# Patient Record
Sex: MALE | URBAN - METROPOLITAN AREA
[De-identification: names, ages, dates, MRNs, and addresses within clinical notes are randomized per-mention and may not be internally consistent; named-entity substitution may affect disease eponyms.]

---

## 2022-10-07 ENCOUNTER — TELEPHONE (OUTPATIENT)
Dept: SLEEP MEDICINE | Facility: CLINIC | Age: 47
End: 2022-10-07

## 2022-10-07 DIAGNOSIS — G47.33 OSA (OBSTRUCTIVE SLEEP APNEA): Primary | ICD-10-CM

## 2022-10-07 NOTE — TELEPHONE ENCOUNTER
Received sleep referral from [facility] routed to Dr. Lozano and Dr. Quinones for review.

## 2022-10-26 NOTE — TELEPHONE ENCOUNTER
Patient sleep consultation had been scheduled. Notified [facility] via [phone].

## 2022-12-09 ENCOUNTER — OFFICE VISIT (OUTPATIENT)
Dept: SLEEP MEDICINE | Facility: CLINIC | Age: 47
End: 2022-12-09
Payer: COMMERCIAL

## 2022-12-09 VITALS
WEIGHT: 222.38 LBS | DIASTOLIC BLOOD PRESSURE: 81 MMHG | BODY MASS INDEX: 31.84 KG/M2 | HEIGHT: 70 IN | OXYGEN SATURATION: 96 % | SYSTOLIC BLOOD PRESSURE: 120 MMHG | HEART RATE: 70 BPM

## 2022-12-09 DIAGNOSIS — G47.33 OSA (OBSTRUCTIVE SLEEP APNEA): Primary | ICD-10-CM

## 2022-12-09 PROCEDURE — 99203 OFFICE O/P NEW LOW 30 MIN: CPT | Performed by: STUDENT IN AN ORGANIZED HEALTH CARE EDUCATION/TRAINING PROGRAM

## 2022-12-09 NOTE — PATIENT INSTRUCTIONS
MY INFORMATION ON SLEEP APNEA-  Royal Reis    DOCTOR : Gene King MD  SLEEP CENTER :      MY CONTACT NUMBER:    [facility]  [phone]  [facility]      For general sleep health questions:   http://sleepeducation.org    For tips about PAP and COVID-19:  https://www.thoracic.org/patients/patient-resources/resources/covid-19-and-home-pap-therapy.pdf    For general info about COVID-19 including vaccines:  https://[facility].org/covid19      Continue PAP therapy every night, for all hours that you are sleeping (including naps.)  As always, try to get at least 8 hours of sleep or more each day, keep a regular sleep schedule, and avoid sleep deprivation. Avoid alcohol.  Reasons that you might need a change to your pressure therapy would be weight gain or loss, waking having inadvertently removed your PAP overnight, having previously felt refreshed by sleep with CPAP use and now waking un-refreshed, and return of daytime sleepiness. Also, the development of new medical problems  (such as heart failure, stroke, medications such as narcotics) can sometimes affect breathing at night and change your PAP therapy needs.  Please bring PAP with you if you are hospitalized.  If anticipating surgery be sure to discuss with your surgeon that you have sleep apnea and use PAP therapy.    Maintain your equipment as recommended which includes routine cleaning and replacement of supplies.      Call DME for any questions regarding supplies or maintenance.    [facility], [facility] [phone]    Do not drive on engage in potentially dangerous activities if feeling sleepy.  Please follow up in sleep clinic again in 3 months.        Tips for your PAP use-    Mask fitting tips  Mask fitting exercise:    To improve your mask seal and your mobility at night, put mask on and secure in place.  Lie down in bed with full pressure and  roll to one side, adjust headgear while in that position to eliminate any leaks. Repeat process rolling to other side.     The mask seal does not have to be perfect:   CPAP machines are designed to make up for small leaks. However, you will not tolerate leaks blowing in your eyes so you will need to adjust.   Any leak should only be near or at the bottom of the mask.  We expect your mask to leak slightly at night.    Do not over-tighten the headgear straps, tighter IS NOT better, we expect minimal leak.    First try re-positioning the mask or headgear before tightening the headgear straps.  Mask leaks are expected due to changing sleeping positions. Try pulling the mask away from your skin allowing the cushion to re-inflate will minimize the leak.  If you struggle for a good fit, try turning the CPAP off and then readjust the mask by pulling it away from your face and then turning back on the CPAP.        Humidifier tips  Humidifiers can be adjusted to increase or decrease the amount of moisture according to your comfort level. You may need to adjust this frequently at first, but then might only change it with seasonal weather changes.     Try INCREASING the humidity if:  You experience a dry, irritated nasal passage or throat.  You have a runny, drippy nose or sneezing fits after using CPAP.  You experience nasal congestion during or after CPAP use.    Try DECREASING the humidity if:  You have excessive condensation or  rain out  in the tubing or mask.  Otherwise keep the tubing warm during the night by running it underneath the blankets or pillow.      Clinic visit after initial PAP set-up   Bring your equipment with you to your 5-8 week follow up clinic visit.  We will be extracting your data from the machine if not available from the cloud based [facility].        Travel  Always take your equipment with you when you travel.  If you fly with your equipment bring it on with you as a carry on.  Medical equipment does  not count as a carry on.    If you travel international the machines take 110-240v.  The only adapter needed is the adapter that will fit into the receptacle (outlet).    You may also want to bring an extension cord as many hotel rooms have limited outlets at the bedside.  Do not travel with water in your humidifier chamber.     Cleaning and Maintenance Guidelines    Equipment Frequency Cleaning Method   Mask First Day    Daily      Weekly Soak mask in hot soapy water for 30 minutes, rinse and air dry.  Wipe nasal cushion with a hot soapy (Ivory, baby shampoo) cloth and rinse.  Baby wipes may also be used.  Do not use anti-bacterial soaps,Henrietta  liquid soap, rubbing alcohol, bleach or ammonia.  Wash frame in hot soapy water (Ivory, baby shampoo) rinse and let air dry   Headgear Biweekly Wash in hot soapy water, rinse and air dry   Reusable Gray Filter Weekly Wash in hot soapy water, rinse, put in towel squeeze moisture out, let air dry   Disposable White Filter Check Weekly Replace when brown or gray in color; at least every 2 to 3 months   Humidifier Chamber Daily    Weekly Empty distilled water from humidifier and let air dry    Hand wash in hot soapy water, rinse and air dry   Tubing Weekly Wash in hot soapy water, rinse and let air dry   Mask, Tubing and Humidifier Chamber As needed Disinfect: Soak in 1 part distilled white vinegar to 3 parts hot water for 30 minutes, rinse well and air dry  Not the material headgear        MASK AND SUPPLY REORDERING and EQUIPMENT NEEDS through your DME and per your insurance  Reminder: Most insurance companies will allow for a new mask, headgear, tubing, and reusable gray filter every six months.  Disposable white ultra-fine filters are covered monthly.      HOME AND SAFETY INSTRUCTIONS  Do not use frayed or cracked electrical cords, multi plug adaptors, or switched receptacles  Do not immerse electrical equipment into water  Assure that electrical cords do not become a tripping  hazard

## 2022-12-09 NOTE — NURSING NOTE
"Chief Complaint   Patient presents with     Consult     DOT;        Initial /81   Pulse 70   Ht 1.771 m (5' 9.72\")   Wt 100.9 kg (222 lb 6 oz)   SpO2 96%   BMI 32.16 kg/m   Estimated body mass index is 32.16 kg/m  as calculated from the following:    Height as of this encounter: 1.771 m (5' 9.72\").    Weight as of this encounter: 100.9 kg (222 lb 6 oz).    Medication Reconciliation: complete    Neck circumference: 43 centimeters.    DME: n/a    JUANA Dasilva  [facility]      "

## 2022-12-10 NOTE — ASSESSMENT & PLAN NOTE
Sleep study showed Moderate obstructive sleep apnea. Sleep related hypoxemia was not present.  Additional findings from the sleep study showed abnormal findings including AHI 19.9, REM AHI 35.3, supine AHI 31.7.    Following discussion with patient a shared decision was made to begin therapy with auto-CPAP at 5-15 cmH2O.    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  He is currently not working and would like to resume his work ASAP

## 2022-12-10 NOTE — PROGRESS NOTES
[place] [facility]  Consultation Note    Name: Royal Reis MRN#: [id number]   Age: 47 year old YOB: 1975     Date of Consultation: 12/09/22  Consultation is requested by: No ref. provider found  Primary care provider: [facility]    History of Present Illness:   Royal Reis is a 47 year old male patient with no known medical history  He is sent by No ref. provider found for a sleep consultation regarding Consult (DOT; )  .    Royal Reis main reason for visit: I did home sleep study given to me by dentist I wanted to make sure the results were legit  Patient states problem(s) started: 3 months ago  Royal Reis's goals for this visit: Figure out what I need to do to get my DOT card with this possible sleep apnea diagnosis    He has had a previous sleep study about 3 months ago. Important findings: AHI 19.9, REM AHI 35.3, supine AHI 31.7.  .    CPAP: No    SCALES:  EPWORTH SLEEPINESS SCALE    [place] Sleepiness Scale ( HUE Robison  1990-1997[facility] [place]/English - Final version - 21 Nov 07 - [facility] Research [employer].) 12/9/2022   [place] Score (Sleep) 6       INSOMNIA SEVERITY INDEX (BRIGID)    Insomnia Severity Index (BRIGID) 12/9/2022   Difficulty falling asleep 1   Difficulty staying asleep 1   Problems waking up too early 1   How SATISFIED/DISSATISFIED are you with your CURRENT sleep pattern? 2   How NOTICEABLE to others do you think your sleep problem is in terms of impairing the quality of your life? 1   How WORRIED/DISTRESSED are you about your current sleep problem? 3   To what extent do you consider your sleep problem to INTERFERE with your daily functioning (e.g. daytime fatigue, mood, ability to function at work/daily chores, concentration, memory, mood, etc.) CURRENTLY? 2   BRIGID Total Score 11   Guidelines for Scoring/Interpretation:  Total score categories:  0-7 = No clinically significant insomnia   8-14 = Subthreshold insomnia   15-21 = Clinical  "insomnia (moderate severity)  22-28 = Clinical insomnia (severe)  Used via courtesy of www.myhealth.va.gov with permission from Mariusz Nieves PhD., [facility]      STOP BANG   STOP BANG Questionnaire (  2008, the American Society of Anesthesiologists, Inc. [employer] 12/9/2022   STOP BANG Score (Sleep) 5   Neck Cir (cm) Clinic: 43   B/P Clinic: 120/81   BMI Clinic: 32.16   MELODY High Risk            Total Score: 5    MELODY: Snores loudly    MELODY: Observed stopped breathing    MELODY: BMI over 35 kg/m2    MELODY: Neck Circum >16 in    MELODY: Male          GAD7  No flowsheet data found.      CAGE-AID  No flowsheet data found.    CAGE-AID reprinted with permission from the [place] Medical Journal, NATHANAEL Garsia and MIKHAIL Wellington, \"Conjoint screening questionnaires for alcohol and drug abuse\" [place] Medical Journal 94: 135-140, 1995.      PATIENT HEALTH QUESTIONNAIRE-9 (PHQ - 9)  No flowsheet data found.  Developed by Mayra Bishop, Sonja Wilson, Romulo Dasilva and colleagues, with an educational katherine from [employer] No permission required to reproduce, translate, display or distribute.      Allergies:    No Known Allergies    Medications:    Current Outpatient Medications   Medication Sig Dispense Refill     UNABLE TO FIND MEDICATION NAME: fluoxetine         Problem List:  Patient Active Problem List    Diagnosis Date Noted     MELODY (obstructive sleep apnea) 12/09/2022     Priority: Medium        Past Medical/Surgical History:  No past medical history on file.  No past surgical history on file.    Social History:  Social History     Socioeconomic History     Marital status: Single     Spouse name: Not on file     Number of children: Not on file     Years of education: Not on file     Highest education level: Not on file   Occupational History     Not on file   Tobacco Use     Smoking status: Never     Smokeless tobacco: Never   Substance and Sexual Activity     Alcohol use: Not on " "file     Drug use: Not on file     Sexual activity: Not on file   Other Topics Concern     Not on file   Social History Narrative     Not on file     Social Determinants of Health     Financial Resource Strain: Not on file   Food Insecurity: Not on file   Transportation Needs: Not on file   Physical Activity: Not on file   Stress: Not on file   Social Connections: Not on file   Intimate Partner Violence: Not on file   Housing Stability: Not on file       Family History:  No family history on file.    Review of Systems:   A complete review of systems reviewed by me is negative with the exeption of what has been mentioned in the history of present illness.      Physical Exam:   Vitals: /81   Pulse 70   Ht 1.771 m (5' 9.72\")   Wt 100.9 kg (222 lb 6 oz)   SpO2 96%   BMI 32.16 kg/m    BMI= Body mass index is 32.16 kg/m .  Neck Cir (cm): 43 cm   GENERAL: Healthy, alert and no distress  EYES: Eyes grossly normal to inspection.  No discharge or erythema, or obvious scleral/conjunctival abnormalities.  RESP: No audible wheeze, cough, or visible cyanosis.  No visible retractions or increased work of breathing.    SKIN: Visible skin clear. No significant rash, abnormal pigmentation or lesions.  NEURO: Cranial nerves grossly intact.  Mentation and speech appropriate for age.  PSYCH: Mentation appears normal, affect normal/bright, judgement and insight intact, normal speech and appearance well-groomed.         Data: All pertinent previous laboratory data reviewed   No results for input(s): NA, POTASSIUM, CHLORIDE, CO2, ANIONGAP, GLC, BUN, CR, MANUELA in the last 12425 hours.    No results for input(s): WBC, RBC, HGB, HCT, MCV, MCH, MCHC, RDW, PLT in the last 02106 hours.    No results for input(s): PROTTOTAL, ALBUMIN, BILITOTAL, ALKPHOS, AST, ALT, BILIDIRECT in the last 45815 hours.    No results found for: TSH    No results found for: UAMP, UBARB, BENZODIAZEUR, UCANN, UCOC, OPIT, UPCP    No results found for: IRONSAT, " [id number], [facility]    No results found for: PH, PHARTERIAL, PO2, [id number], SAT, PCO2, HCO3, BASEEXCESS, TOM, BEB    @LABRCNTIPR(phv:4,pco2v:4,po2v:4,hco3v:4,chidi:4,o2per:4)@    Echocardiology: No results found for this or any previous visit (from the past 4320 hour(s)).    Chest x-ray: No results found for this or any previous visit from the past 365 days.      Chest CT: No results found for this or any previous visit from the past 365 days.      PFT: Most Recent Breeze Pulmonary Function Testing  No results found    Assessment and Plan:     Problem List Items Addressed This Visit        Respiratory    MELODY (obstructive sleep apnea) - Primary     Sleep study showed Moderate obstructive sleep apnea. Sleep related hypoxemia was not present.  Additional findings from the sleep study showed abnormal findings including AHI 19.9, REM AHI 35.3, supine AHI 31.7.    Following discussion with patient a shared decision was made to begin therapy with auto-CPAP at 5-15 cmH2O.    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  He is currently not working and would like to resume his work ASAP              Patient was strongly advised to avoid driving, operating any heavy machinery or other hazardous situations while drowsy or sleepy.  Patient was counseled on the importance of driving while alert, to pull over if drowsy, or nap before getting into the vehicle if sleepy.      Plan is for Royal Reis to follow up as needed.     The above note was dictated using voice recognition software. Although reviewed after completion, some word and grammatical error may remain . Please contact the author for any clarifications.    Total time spent reviewing medical records, history and physical examination, review of previous testing and  interpretation as well as documentation on this date, 12/09/22: 37 minutes    Gene King MD on 12/9/2022   [facility]   [address]oor [address], [address]   [address] [place] [address]   Appointments: [phone]    CC: No ref. provider found

## 2022-12-13 ENCOUNTER — TELEPHONE (OUTPATIENT)
Dept: SCHEDULING | Facility: CLINIC | Age: 47
End: 2022-12-13

## 2022-12-13 NOTE — TELEPHONE ENCOUNTER
Reason for call:  Other   Patient called regarding (reason for call): call back  Additional comments: [facility] on 12/09/2022. [facility] Fax is [phone]. Pt states there are some requirements with the prescription as well, Indicate the condition, describe the need for service. Please reach out to pt with any questions and please reach out to pt when that is sent over.   Phone number to reach patient:  Cell number on file:    Telephone Information:   Mobile [phone]       Best Time:  Anytime    Can we leave a detailed message on this number?  YES    Travel screening: Not Applicable

## 2022-12-14 NOTE — TELEPHONE ENCOUNTER
Face sheet, JOHANN, office visit notes, and a copy of sleep study report send via fax to [facility] at [phone]. JOHANN scanned into the chart.    Per Xavier [facility]

## 2023-02-12 ENCOUNTER — HEALTH MAINTENANCE LETTER (OUTPATIENT)
Age: 48
End: 2023-02-12

## 2024-03-10 ENCOUNTER — HEALTH MAINTENANCE LETTER (OUTPATIENT)
Age: 49
End: 2024-03-10

## 2025-03-16 ENCOUNTER — HEALTH MAINTENANCE LETTER (OUTPATIENT)
Age: 50
End: 2025-03-16